# Patient Record
Sex: MALE | Race: WHITE | Employment: UNEMPLOYED | URBAN - METROPOLITAN AREA
[De-identification: names, ages, dates, MRNs, and addresses within clinical notes are randomized per-mention and may not be internally consistent; named-entity substitution may affect disease eponyms.]

---

## 2024-10-23 ENCOUNTER — APPOINTMENT (OUTPATIENT)
Facility: HOSPITAL | Age: 69
End: 2024-10-23
Payer: MEDICARE

## 2024-10-23 ENCOUNTER — HOSPITAL ENCOUNTER (EMERGENCY)
Facility: HOSPITAL | Age: 69
Discharge: HOME | End: 2024-10-23
Attending: EMERGENCY MEDICINE
Payer: MEDICARE

## 2024-10-23 VITALS
WEIGHT: 230 LBS | HEART RATE: 64 BPM | TEMPERATURE: 97.7 F | BODY MASS INDEX: 31.15 KG/M2 | HEIGHT: 72 IN | DIASTOLIC BLOOD PRESSURE: 95 MMHG | OXYGEN SATURATION: 95 % | RESPIRATION RATE: 18 BRPM | SYSTOLIC BLOOD PRESSURE: 154 MMHG

## 2024-10-23 DIAGNOSIS — S09.90XA INJURY OF HEAD, INITIAL ENCOUNTER: Primary | ICD-10-CM

## 2024-10-23 DIAGNOSIS — S46.911A STRAIN OF RIGHT SHOULDER, INITIAL ENCOUNTER: ICD-10-CM
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
## 2024-10-23 PROCEDURE — 6370000000 HC RX 637 (ALT 250 FOR IP): Performed by: EMERGENCY MEDICINE

## 2024-10-23 PROCEDURE — 93005 ELECTROCARDIOGRAM TRACING: CPT | Performed by: STUDENT IN AN ORGANIZED HEALTH CARE EDUCATION/TRAINING PROGRAM

## 2024-10-23 PROCEDURE — 72131 CT LUMBAR SPINE W/O DYE: CPT

## 2024-10-23 PROCEDURE — 99284 EMERGENCY DEPT VISIT MOD MDM: CPT

## 2024-10-23 PROCEDURE — 73030 X-RAY EXAM OF SHOULDER: CPT

## 2024-10-23 PROCEDURE — 72125 CT NECK SPINE W/O DYE: CPT

## 2024-10-23 PROCEDURE — 70450 CT HEAD/BRAIN W/O DYE: CPT

## 2024-10-23 RX ORDER — ACETAMINOPHEN 500 MG
1000 TABLET ORAL
Status: COMPLETED | OUTPATIENT
Start: 2024-10-23 | End: 2024-10-23

## 2024-10-23 RX ORDER — CYCLOBENZAPRINE HCL 10 MG
10 TABLET ORAL 3 TIMES DAILY PRN
Qty: 21 TABLET | Refills: 0 | Status: SHIPPED | OUTPATIENT
Start: 2024-10-23 | End: 2024-11-02

## 2024-10-23 RX ORDER — CYCLOBENZAPRINE HCL 10 MG
10 TABLET ORAL
Status: DISCONTINUED | OUTPATIENT
Start: 2024-10-23 | End: 2024-10-23

## 2024-10-23 RX ADMIN — ACETAMINOPHEN 1000 MG: 500 TABLET ORAL at 14:53

## 2024-10-23 ASSESSMENT — ENCOUNTER SYMPTOMS
RESPIRATORY NEGATIVE: 1
EYES NEGATIVE: 1
GASTROINTESTINAL NEGATIVE: 1
BACK PAIN: 1

## 2024-10-23 ASSESSMENT — PAIN DESCRIPTION - LOCATION
LOCATION: BACK
LOCATION: NECK;SHOULDER;BACK

## 2024-10-23 ASSESSMENT — PAIN DESCRIPTION - DESCRIPTORS
DESCRIPTORS: ACHING
DESCRIPTORS: ACHING

## 2024-10-23 ASSESSMENT — PAIN - FUNCTIONAL ASSESSMENT
PAIN_FUNCTIONAL_ASSESSMENT: 0-10
PAIN_FUNCTIONAL_ASSESSMENT: 0-10

## 2024-10-23 ASSESSMENT — PAIN SCALES - GENERAL
PAINLEVEL_OUTOF10: 6
PAINLEVEL_OUTOF10: 6

## 2024-10-23 ASSESSMENT — PAIN DESCRIPTION - PAIN TYPE
TYPE: ACUTE PAIN
TYPE: ACUTE PAIN

## 2024-10-23 ASSESSMENT — PAIN DESCRIPTION - ORIENTATION: ORIENTATION: LOWER

## 2024-10-23 ASSESSMENT — PAIN DESCRIPTION - FREQUENCY: FREQUENCY: CONTINUOUS
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
## 2024-10-23 NOTE — DISCHARGE INSTRUCTIONS
You were seen in the emergency department for headache, neck pain, and back pain following a fall.  Please take any medications prescribed at this visit as instructed.  Please follow-up with your PCP or return to the emergency department if you experience a worsening of symptoms or any new symptoms that are concerning to you.

## 2024-10-23 NOTE — ED NOTES
Pt given discharge instructions, patient education, 1 prescriptions and follow up information. Pt verbalizes understanding. All questions answered. Pt ambulatory to waiting room awaiting ride. Pt A&Ox4, RA and pain controlled.

## 2024-10-23 NOTE — ED PROVIDER NOTES
ED SIGN OUT NOTE  Care assumed at [facility] 3:38 PM EDT    Patient was signed out to me by Dr. Mae.     Patient signed out pending imaging results, re-evaluation, and disposition      BP (!) 154/95   Pulse 64   Temp 97.7 °F (36.5 °C) (Oral)   Resp 18   Ht 1.829 m (6')   Wt 104.3 kg (230 lb)   SpO2 95%   BMI 31.19 kg/m²   Labs Reviewed - No data to display  XR SHOULDER RIGHT (MIN 2 VIEWS)   Final Result   No acute abnormality.      Electronically signed by [employer]      CT LUMBAR SPINE WO CONTRAST   Final Result   No fracture.         Electronically signed by [employer]      CT CSpine W/O Contrast   Final Result   No fracture.         Electronically signed by [employer]      CT Head W/O Contrast   Final Result      No evidence for acute intracranial abnormality               Electronically signed by Daryl Mcfarland        ED Course as of 10/24/24 0059   Wed Oct 23, 2024   1800 EKG rate 50bpm, sinus rhythm with 1st degree AV block, no STEMI [MG]      ED Course User Index  [MG] Nathalia Guerrero DO     Diagnosis:   1. Injury of head, initial encounter    2. Fall, initial encounter    3. Strain of right shoulder, initial encounter      Disposition:   Decision To Discharge 10/23/2024 04:11:01 PM        Plan:     Patient had a mechanical fall, not on thinners, unknown head injury, no LOC. Signed out pending imaging, re-evaluation, disposition.       Patient re-evaluated.  Pain improved after Tylenol.  Imaging grossly unremarkable.  Removed c-collar utilizing Nexus criteria.  Overall low suspicion for more sinister process given benign exam and reassuring workup, resolving symptoms.  Patient requesting discharge.  He will follow-up with PCP, close return precautions discussed.     Diagnosis is fall, head injury, shoulder strain. Disposition is Discharge with PCP follow-up. Workup and plan discussed with patient , return precautions given for worsening or concerning symptoms, all 
    Socioeconomic History    Marital status:      Spouse name: None    Number of children: None    Years of education: None    Highest education level: None   Tobacco Use    Smoking status: Never    Smokeless tobacco: Never   Substance and Sexual Activity    Alcohol use: Yes     Comment: socially    Drug use: Never           PHYSICAL EXAM    (up to 7 for level 4, 8 or more for level 5)     ED Triage Vitals   BP Systolic BP Percentile Diastolic BP Percentile Temp Temp Source Pulse Respirations SpO2   10/23/24 1314 -- -- 10/23/24 1316 10/23/24 1314 10/23/24 1314 10/23/24 1314 10/23/24 1314   (!) 154/95   97.7 °F (36.5 °C) Oral 64 18 95 %      Height Weight - Scale         10/23/24 1259 10/23/24 1259         1.829 m (6') 104.3 kg (230 lb)             Body mass index is 31.19 kg/m².    Physical Exam  Vitals and nursing note reviewed.   Constitutional:       General: He is not in acute distress.     Appearance: Normal appearance. He is not ill-appearing.   HENT:      Head: Normocephalic and atraumatic.      Comments: No bony crepitus, no hematomas     Nose: Nose normal.      Mouth/Throat:      Mouth: Mucous membranes are moist.   Eyes:      Pupils: Pupils are equal, round, and reactive to light.   Cardiovascular:      Rate and Rhythm: Normal rate and regular rhythm.      Pulses: Normal pulses.   Pulmonary:      Effort: Pulmonary effort is normal.      Breath sounds: Normal breath sounds.   Abdominal:      General: There is no distension.      Palpations: Abdomen is soft.      Tenderness: There is no abdominal tenderness.   Musculoskeletal:      Cervical back: Normal range of motion.      Comments: Mild tenderness to palpation over R shoulder, no limited ROM. Mild tenderness to palpation over posterior neck, lumbar back   Skin:     General: Skin is warm and dry.   Neurological:      General: No focal deficit present.      Mental Status: He is alert and oriented to person, place, and time.   Psychiatric:

## 2024-10-24 LAB
EKG ATRIAL RATE: 50 BPM
EKG DIAGNOSIS: NORMAL
EKG P AXIS: 66 DEGREES
EKG P-R INTERVAL: 228 MS
EKG Q-T INTERVAL: 476 MS
EKG QRS DURATION: 110 MS
EKG QTC CALCULATION (BAZETT): 433 MS
EKG R AXIS: 68 DEGREES
EKG T AXIS: 85 DEGREES
EKG VENTRICULAR RATE: 50 BPM